# Patient Record
(demographics unavailable — no encounter records)

---

## 2025-01-07 NOTE — HISTORY OF PRESENT ILLNESS
[FreeTextEntry1] : follow up [de-identified] : 59 years old female with HTNM HLD presents for follow up, states feeling well, had episode of syncope last months while in office working, states she hadn't eaten and was hot, likely dehydrated; felt well after few minutes, denies palpitations or headache, no chest pain.

## 2025-01-07 NOTE — PLAN
[FreeTextEntry1] : 1. HTN * c/w HCTZ 12.5 mg, refilled 2. hypercholesterolemia * lab for fasting lipid panel * low cholesterol, low triglycerides diet, dietary counseling given; dietary avoidance discussed; diet and exercise reviewed with patient * c/w rosuvastatin 10 mg, refilled 3. prediabetes * lab for a1c * Dietary counseling given, dietary avoidance discussed, diet and exercise reviewed with patient; patient reminded of importance of aerobic exercise, weight control, dietary compliance and regular glucose monitoring * f/u in three months

## 2025-04-29 NOTE — HISTORY OF PRESENT ILLNESS
[FreeTextEntry1] : follow up [de-identified] : 60 years old female with HTN, hypercholesterolemia, prediabetes presents for follow up to review and discuss labs results; she states feeling well; due for mammogram, needs referral, also she has not done colonoscopy yet;  she is due for general blood work today and needs refills of medications

## 2025-04-29 NOTE — PLAN
[FreeTextEntry1] : 1. HTN * c/w HCTZ 12.5 mg, refilled * fasting general blood work done 2. hypercholesterolemia * low cholesterol, low triglycerides diet, dietary counseling given; dietary avoidance discussed; diet and exercise reviewed with patient * c/w rosuvastatin 10 mg, refilled 3. prediabetes * importance of dietary adherence compliance stressed * Dietary counseling given, dietary avoidance discussed, diet and exercise reviewed with patient; patient reminded of importance of aerobic exercise, weight control, dietary compliance and regular glucose monitoring 4. breast cancer screening * referral for mammogram 5. colon cancer screening * cologuard * GI referral for colonoscopy * f/u in three months for CPE

## 2025-07-29 NOTE — PLAN
[FreeTextEntry1] : 1. HTN * c/w HCTZ 12.5 mg, refilled * sodium restriction diet stressed 2. hypercholesterolemia * dietary adherence compliance stressed * c/w rosuvastatin 10 mg * low cholesterol, low triglycerides diet, dietary counseling given; dietary avoidance discussed; diet and exercise reviewed with patient 3. prediabetes * Dietary counseling given, dietary avoidance discussed, diet and exercise reviewed with patient; patient reminded of importance of aerobic exercise, weight control, dietary compliance and regular glucose monitoring 4. hypokalemia * recommended increase potassium rich meals such as tomatoes, avocados, bananas, etc 5. abnormal mammogram right breast * likely benign asymmetry , report of US and diagnostic mammogram reviewed. * recommended repeat in six months, she is aware * f/u in six months

## 2025-07-29 NOTE — HISTORY OF PRESENT ILLNESS
[FreeTextEntry1] : follow up [de-identified] : 60 years old female with HTN, hypercholesterolemia, prediabetes presents for follow up to review labs results; offers no complaints